# Patient Record
Sex: MALE | Race: WHITE | NOT HISPANIC OR LATINO | URBAN - METROPOLITAN AREA
[De-identification: names, ages, dates, MRNs, and addresses within clinical notes are randomized per-mention and may not be internally consistent; named-entity substitution may affect disease eponyms.]

---

## 2022-12-30 ENCOUNTER — OFFICE (OUTPATIENT)
Dept: URBAN - METROPOLITAN AREA CLINIC 13 | Facility: CLINIC | Age: 72
End: 2022-12-30

## 2022-12-30 VITALS
SYSTOLIC BLOOD PRESSURE: 138 MMHG | WEIGHT: 179 LBS | HEART RATE: 59 BPM | OXYGEN SATURATION: 99 % | HEIGHT: 68 IN | DIASTOLIC BLOOD PRESSURE: 82 MMHG

## 2022-12-30 DIAGNOSIS — Z80.0 FAMILY HISTORY OF MALIGNANT NEOPLASM OF DIGESTIVE ORGANS: ICD-10-CM

## 2022-12-30 DIAGNOSIS — R19.4 CHANGE IN BOWEL HABIT: ICD-10-CM

## 2022-12-30 DIAGNOSIS — Z86.010 PERSONAL HISTORY OF COLONIC POLYPS: ICD-10-CM

## 2022-12-30 DIAGNOSIS — R10.10 UPPER ABDOMINAL PAIN, UNSPECIFIED: ICD-10-CM

## 2022-12-30 LAB
CBC, PLATELET CT  AND  DIFF: ABS BASOPHIL: 0.1 K/UL
CBC, PLATELET CT  AND  DIFF: ABS EOSINOPHIL: 0.1 K/UL
CBC, PLATELET CT  AND  DIFF: ABS IMMATURE GRANS: 0 K/UL
CBC, PLATELET CT  AND  DIFF: ABS LYMPHOCYTE: 1.8 K/UL
CBC, PLATELET CT  AND  DIFF: ABS MONOCYTE: 0.7 K/UL
CBC, PLATELET CT  AND  DIFF: ABS NEUTROPHIL: 4.2 K/UL
CBC, PLATELET CT  AND  DIFF: BASOPHIL: 0.7 %
CBC, PLATELET CT  AND  DIFF: DIFFERENTIAL: (no result)
CBC, PLATELET CT  AND  DIFF: EOSINOPHIL: 1.9 %
CBC, PLATELET CT  AND  DIFF: HEMATOCRIT: 41 %
CBC, PLATELET CT  AND  DIFF: HEMOGLOBIN: 13.7 G/DL
CBC, PLATELET CT  AND  DIFF: IMMATURE GRANULOCYTES: 0.1 %
CBC, PLATELET CT  AND  DIFF: LYMPHOCYTE: 25.7 %
CBC, PLATELET CT  AND  DIFF: MCH: 30.3 PG
CBC, PLATELET CT  AND  DIFF: MCHC: 33.4 G/DL
CBC, PLATELET CT  AND  DIFF: MCV: 90.7 FL
CBC, PLATELET CT  AND  DIFF: MONOCYTE: 10.4 %
CBC, PLATELET CT  AND  DIFF: MPV: 10.5 FL
CBC, PLATELET CT  AND  DIFF: NEUTROPHIL: 61.2 %
CBC, PLATELET CT  AND  DIFF: NRBCS: 0 /100 WBC
CBC, PLATELET CT  AND  DIFF: PLATELET COUNT: 180 K/UL
CBC, PLATELET CT  AND  DIFF: RBC: 4.52 M/UL
CBC, PLATELET CT  AND  DIFF: RDW: 12.9 %
CBC, PLATELET CT  AND  DIFF: WBC COUNT: 6.9 K/UL
COMPREHENSIVE METABOLIC PANEL: A/G RATIO: 2.7 RATIO — HIGH
COMPREHENSIVE METABOLIC PANEL: ALBUMIN: 4.9 G/DL
COMPREHENSIVE METABOLIC PANEL: ALK PHOSPHATASE: 50 U/L
COMPREHENSIVE METABOLIC PANEL: ALT: 16 U/L
COMPREHENSIVE METABOLIC PANEL: AST: 26 U/L
COMPREHENSIVE METABOLIC PANEL: BILIRUBIN,TOTAL: 0.6 MG/DL
COMPREHENSIVE METABOLIC PANEL: BLOOD UREA NITROGEN: 15 MG/DL
COMPREHENSIVE METABOLIC PANEL: BUN/CREAT RATIO: 17
COMPREHENSIVE METABOLIC PANEL: CALCIUM: 9.9 MG/DL
COMPREHENSIVE METABOLIC PANEL: CHLORIDE: 106 MEQ/L
COMPREHENSIVE METABOLIC PANEL: CO2: 28 MEQ/L
COMPREHENSIVE METABOLIC PANEL: CREATININE: 0.9 MG/DL
COMPREHENSIVE METABOLIC PANEL: FASTING STATUS: (no result)
COMPREHENSIVE METABOLIC PANEL: GLOBULIN: 1.8 G/DL — LOW
COMPREHENSIVE METABOLIC PANEL: GLOMERULAR FILTRATION RATE (GFR): 90 MLS/MIN/1.73M2
COMPREHENSIVE METABOLIC PANEL: GLUCOSE,RANDOM: 88 MG/DL
COMPREHENSIVE METABOLIC PANEL: POTASSIUM: 4.5 MEQ/L
COMPREHENSIVE METABOLIC PANEL: SODIUM: 143 MEQ/L
COMPREHENSIVE METABOLIC PANEL: TOTAL PROTEIN: 6.7 G/DL
TSH: 1.03 MCIU/ML

## 2022-12-30 PROCEDURE — 99204 OFFICE O/P NEW MOD 45 MIN: CPT | Performed by: INTERNAL MEDICINE

## 2023-01-18 ENCOUNTER — AMBULATORY SURGICAL CENTER (OUTPATIENT)
Dept: URBAN - METROPOLITAN AREA SURGERY 4 | Facility: SURGERY | Age: 73
End: 2023-01-18
Payer: MEDICARE

## 2023-01-18 VITALS
SYSTOLIC BLOOD PRESSURE: 114 MMHG | OXYGEN SATURATION: 97 % | HEART RATE: 35 BPM | WEIGHT: 175 LBS | SYSTOLIC BLOOD PRESSURE: 149 MMHG | HEART RATE: 55 BPM | RESPIRATION RATE: 22 BRPM | RESPIRATION RATE: 17 BRPM | SYSTOLIC BLOOD PRESSURE: 115 MMHG | SYSTOLIC BLOOD PRESSURE: 138 MMHG | SYSTOLIC BLOOD PRESSURE: 96 MMHG | DIASTOLIC BLOOD PRESSURE: 59 MMHG | RESPIRATION RATE: 17 BRPM | SYSTOLIC BLOOD PRESSURE: 144 MMHG | SYSTOLIC BLOOD PRESSURE: 115 MMHG | SYSTOLIC BLOOD PRESSURE: 141 MMHG | OXYGEN SATURATION: 91 % | HEART RATE: 73 BPM | OXYGEN SATURATION: 100 % | DIASTOLIC BLOOD PRESSURE: 64 MMHG | DIASTOLIC BLOOD PRESSURE: 64 MMHG | RESPIRATION RATE: 15 BRPM | SYSTOLIC BLOOD PRESSURE: 96 MMHG | RESPIRATION RATE: 15 BRPM | DIASTOLIC BLOOD PRESSURE: 57 MMHG | TEMPERATURE: 97.5 F | DIASTOLIC BLOOD PRESSURE: 91 MMHG | DIASTOLIC BLOOD PRESSURE: 73 MMHG | HEIGHT: 68 IN | HEART RATE: 69 BPM | DIASTOLIC BLOOD PRESSURE: 56 MMHG | OXYGEN SATURATION: 98 % | DIASTOLIC BLOOD PRESSURE: 68 MMHG | RESPIRATION RATE: 16 BRPM | RESPIRATION RATE: 16 BRPM | RESPIRATION RATE: 28 BRPM | DIASTOLIC BLOOD PRESSURE: 68 MMHG | DIASTOLIC BLOOD PRESSURE: 66 MMHG | HEIGHT: 68 IN | HEART RATE: 35 BPM | SYSTOLIC BLOOD PRESSURE: 149 MMHG | OXYGEN SATURATION: 100 % | HEART RATE: 81 BPM | OXYGEN SATURATION: 91 % | HEART RATE: 73 BPM | HEART RATE: 55 BPM | SYSTOLIC BLOOD PRESSURE: 138 MMHG | HEART RATE: 81 BPM | SYSTOLIC BLOOD PRESSURE: 110 MMHG | SYSTOLIC BLOOD PRESSURE: 144 MMHG | HEART RATE: 97 BPM | SYSTOLIC BLOOD PRESSURE: 110 MMHG | TEMPERATURE: 97.5 F | DIASTOLIC BLOOD PRESSURE: 57 MMHG | DIASTOLIC BLOOD PRESSURE: 91 MMHG | RESPIRATION RATE: 22 BRPM | SYSTOLIC BLOOD PRESSURE: 114 MMHG | SYSTOLIC BLOOD PRESSURE: 141 MMHG | RESPIRATION RATE: 20 BRPM | OXYGEN SATURATION: 99 % | OXYGEN SATURATION: 97 % | DIASTOLIC BLOOD PRESSURE: 56 MMHG | HEART RATE: 69 BPM | RESPIRATION RATE: 28 BRPM | OXYGEN SATURATION: 98 % | DIASTOLIC BLOOD PRESSURE: 59 MMHG | DIASTOLIC BLOOD PRESSURE: 66 MMHG | DIASTOLIC BLOOD PRESSURE: 73 MMHG | WEIGHT: 175 LBS | HEART RATE: 97 BPM | RESPIRATION RATE: 20 BRPM | OXYGEN SATURATION: 99 %

## 2023-01-18 DIAGNOSIS — Z86.010 PERSONAL HISTORY OF COLONIC POLYPS: ICD-10-CM

## 2023-01-18 DIAGNOSIS — Z80.0 FAMILY HISTORY OF MALIGNANT NEOPLASM OF DIGESTIVE ORGANS: ICD-10-CM

## 2023-01-18 DIAGNOSIS — K57.30 DIVERTICULOSIS OF LARGE INTESTINE WITHOUT PERFORATION OR ABS: ICD-10-CM

## 2023-01-18 PROCEDURE — G0105 COLORECTAL SCRN; HI RISK IND: HCPCS | Performed by: INTERNAL MEDICINE

## 2023-01-18 NOTE — SERVICENOTES
Screening colonoscopy will prevent most but not all colon cancers
Fiber supplementation as tolerated
MiraLAX OTC as needed to keep stools soft
Colonoscopy for family members as per guidelines.  Consult with treating physician 
[facility] enhanced colonoscopy done

## 2023-01-18 NOTE — SERVICEHPINOTES
Patient is undergoing surveillance colonoscopy for personal history of adenomatous colon polyps-tubular adenoma-2 mm in 2015. He also reports family history of colon cancer-father-age 60

## 2023-02-16 ENCOUNTER — OFFICE (OUTPATIENT)
Dept: URBAN - METROPOLITAN AREA CLINIC 13 | Facility: CLINIC | Age: 73
End: 2023-02-16

## 2023-02-16 VITALS
HEART RATE: 57 BPM | DIASTOLIC BLOOD PRESSURE: 90 MMHG | WEIGHT: 176 LBS | HEIGHT: 68 IN | SYSTOLIC BLOOD PRESSURE: 146 MMHG | OXYGEN SATURATION: 98 %

## 2023-02-16 DIAGNOSIS — R10.11 RIGHT UPPER QUADRANT PAIN: ICD-10-CM

## 2023-02-16 DIAGNOSIS — K57.30 DIVERTICULOSIS OF LARGE INTESTINE WITHOUT PERFORATION OR ABS: ICD-10-CM

## 2023-02-16 DIAGNOSIS — Z86.010 PERSONAL HISTORY OF COLONIC POLYPS: ICD-10-CM

## 2023-02-16 PROCEDURE — 99214 OFFICE O/P EST MOD 30 MIN: CPT | Performed by: INTERNAL MEDICINE

## 2023-03-27 ENCOUNTER — OFFICE (OUTPATIENT)
Dept: URBAN - METROPOLITAN AREA CLINIC 13 | Facility: CLINIC | Age: 73
End: 2023-03-27

## 2023-03-27 VITALS
HEART RATE: 56 BPM | SYSTOLIC BLOOD PRESSURE: 140 MMHG | OXYGEN SATURATION: 99 % | DIASTOLIC BLOOD PRESSURE: 82 MMHG | WEIGHT: 179 LBS | HEIGHT: 68 IN

## 2023-03-27 DIAGNOSIS — R10.10 UPPER ABDOMINAL PAIN, UNSPECIFIED: ICD-10-CM

## 2023-03-27 DIAGNOSIS — E66.3 OVERWEIGHT: ICD-10-CM

## 2023-03-27 DIAGNOSIS — K57.30 DIVERTICULOSIS OF LARGE INTESTINE WITHOUT PERFORATION OR ABS: ICD-10-CM

## 2023-03-27 PROCEDURE — 99214 OFFICE O/P EST MOD 30 MIN: CPT | Performed by: INTERNAL MEDICINE

## 2023-04-11 ENCOUNTER — AMBULATORY SURGICAL CENTER (OUTPATIENT)
Dept: URBAN - METROPOLITAN AREA SURGERY 4 | Facility: SURGERY | Age: 73
End: 2023-04-11

## 2023-04-11 VITALS
DIASTOLIC BLOOD PRESSURE: 69 MMHG | TEMPERATURE: 97.4 F | DIASTOLIC BLOOD PRESSURE: 74 MMHG | SYSTOLIC BLOOD PRESSURE: 142 MMHG | HEIGHT: 68 IN | SYSTOLIC BLOOD PRESSURE: 116 MMHG | HEART RATE: 55 BPM | DIASTOLIC BLOOD PRESSURE: 69 MMHG | HEART RATE: 47 BPM | HEART RATE: 47 BPM | HEART RATE: 55 BPM | OXYGEN SATURATION: 100 % | SYSTOLIC BLOOD PRESSURE: 132 MMHG | HEART RATE: 50 BPM | SYSTOLIC BLOOD PRESSURE: 151 MMHG | OXYGEN SATURATION: 99 % | RESPIRATION RATE: 8 BRPM | OXYGEN SATURATION: 95 % | DIASTOLIC BLOOD PRESSURE: 68 MMHG | OXYGEN SATURATION: 99 % | HEART RATE: 50 BPM | SYSTOLIC BLOOD PRESSURE: 146 MMHG | DIASTOLIC BLOOD PRESSURE: 68 MMHG | SYSTOLIC BLOOD PRESSURE: 146 MMHG | DIASTOLIC BLOOD PRESSURE: 57 MMHG | SYSTOLIC BLOOD PRESSURE: 151 MMHG | OXYGEN SATURATION: 98 % | SYSTOLIC BLOOD PRESSURE: 142 MMHG | RESPIRATION RATE: 16 BRPM | RESPIRATION RATE: 16 BRPM | DIASTOLIC BLOOD PRESSURE: 74 MMHG | WEIGHT: 179 LBS | DIASTOLIC BLOOD PRESSURE: 71 MMHG | RESPIRATION RATE: 8 BRPM | WEIGHT: 179 LBS | OXYGEN SATURATION: 95 % | OXYGEN SATURATION: 100 % | HEART RATE: 58 BPM | OXYGEN SATURATION: 98 % | SYSTOLIC BLOOD PRESSURE: 121 MMHG | DIASTOLIC BLOOD PRESSURE: 57 MMHG | HEART RATE: 58 BPM | TEMPERATURE: 97.4 F | HEIGHT: 68 IN | SYSTOLIC BLOOD PRESSURE: 132 MMHG | SYSTOLIC BLOOD PRESSURE: 121 MMHG | SYSTOLIC BLOOD PRESSURE: 116 MMHG | DIASTOLIC BLOOD PRESSURE: 71 MMHG

## 2023-04-11 DIAGNOSIS — R10.10 UPPER ABDOMINAL PAIN, UNSPECIFIED: ICD-10-CM

## 2023-04-11 PROCEDURE — 43235 EGD DIAGNOSTIC BRUSH WASH: CPT | Performed by: INTERNAL MEDICINE

## 2023-04-11 NOTE — SERVICEHPINOTES
Patient is undergoing EGD for evaluation of upper abdominal pain. Right upper quadrant ultrasound/CBC/CMP unremarkable.